# Patient Record
Sex: FEMALE | Race: WHITE | HISPANIC OR LATINO | URBAN - METROPOLITAN AREA
[De-identification: names, ages, dates, MRNs, and addresses within clinical notes are randomized per-mention and may not be internally consistent; named-entity substitution may affect disease eponyms.]

---

## 2024-09-16 ENCOUNTER — OFFICE (OUTPATIENT)
Dept: URBAN - METROPOLITAN AREA CLINIC 106 | Facility: CLINIC | Age: 44
End: 2024-09-16

## 2024-09-16 VITALS — WEIGHT: 174 LBS

## 2024-09-16 DIAGNOSIS — K75.81 NONALCOHOLIC STEATOHEPATITIS (NASH) - METABOLIC-ASSOCIATED STEATOHEPATITIS (MASH): ICD-10-CM

## 2024-09-16 DIAGNOSIS — K64.9 HEMORRHOIDS: ICD-10-CM

## 2024-09-16 DIAGNOSIS — K21.9 GERD: ICD-10-CM

## 2024-09-16 DIAGNOSIS — R11.0 NAUSEA: ICD-10-CM

## 2024-09-16 DIAGNOSIS — K59.00 CONSTIPATION: ICD-10-CM

## 2024-09-16 DIAGNOSIS — R14.1 BLOATING, GAS PAIN: ICD-10-CM

## 2024-09-16 PROCEDURE — 99204 OFFICE O/P NEW MOD 45 MIN: CPT | Performed by: INTERNAL MEDICINE

## 2024-09-16 RX ORDER — METOCLOPRAMIDE 10 MG/1
30 TABLET ORAL
Qty: 90 | Status: ACTIVE
Start: 2024-09-16

## 2024-09-16 RX ORDER — PANTOPRAZOLE SODIUM 40 MG/1
TABLET, DELAYED RELEASE ORAL
Qty: 90 | Status: ACTIVE
Start: 2024-09-16

## 2024-09-16 NOTE — SERVICEHPINOTES
ayanna   HEATHER GARCIA   is seen for an initial visit today.    The patient complains of abdominal pain that began 2-3 months ago.     It is localized as   epigastric region   pain.    It is described as   mild   in nature.   Pain quality is described as   burning and pressure it is non-radiating  .    Discomfort typically lasts   several  minutes   and has a course which is   steady  .   It usually starts   gradually  .    Symptom triggers include   bowel movements  .  Alleviating factors include   positional change, eating food  .    Symptoms have been   non-progressive/stable   since onset.    Alarm features noted:   none  .   The patient also reports   abdominal bloating/distension, heartburn and nausea  .     She has been taking pantoprazole 20mg "as needed for heartburn" as well as Zofran 4mg ODT "as needed for nausea." She believes she may have H. pylori and is requesting testing.  ayanna urena The patient has a personal history of metabolic dysfunction-associated steatotic liver disease. She denies RUQ pain. No recent labs on file. The patient is seen for evaluation of constipation.    Notes the onset of constipation   3 - 5  months   ago.  Symptoms started   gradually  .   Currently, the patient evacuates stool   3   times per   week  .    The stools are   sausage shaped, but lumpy ([place] Stool Type 2)  .   They are typically   brown   in color.    Associated symptoms include   alternating bowel pattern, abdominal bloating/distension, LLQ abdominal pain, generalized abdominal pain, a sense of incomplete evacuation  . She reports occasional struggles with internal hemorrhoids leading to occasional BRBPR when straining to have a bowel movement.  Symptoms are alleviated with   nothing specific  .  The patient has previously medicated symptoms with   Milk of Magnesia and stool softeners   with moderate relief. She has a family history of colon cancer she denies any previous colon cancer screenings.ayanna urena Of note, patient is on a GLP-1 medication and has noted worsening of both upper and lower GI symptoms since initiating medication.ayanna

## 2025-01-20 ENCOUNTER — APPOINTMENT (OUTPATIENT)
Dept: FAMILY MEDICINE | Age: 45
End: 2025-01-20

## 2025-01-28 ENCOUNTER — AMBULATORY SURGICAL CENTER (OUTPATIENT)
Dept: URBAN - METROPOLITAN AREA SURGERY 68 | Facility: SURGERY | Age: 45
End: 2025-01-28

## 2025-01-28 DIAGNOSIS — K22.10 ULCER OF ESOPHAGUS WITHOUT BLEEDING: ICD-10-CM

## 2025-01-28 DIAGNOSIS — K21.9 GASTRO-ESOPHAGEAL REFLUX DISEASE WITHOUT ESOPHAGITIS: ICD-10-CM

## 2025-01-28 DIAGNOSIS — K29.70 GASTRITIS, UNSPECIFIED, WITHOUT BLEEDING: ICD-10-CM

## 2025-01-28 PROBLEM — K63.5 POLYP OF COLON: Status: ACTIVE | Noted: 2025-01-28

## 2025-01-28 PROBLEM — K57.30 DIVERTICULOSIS OF LARGE INTESTINE WITHOUT PERFORATION OR ABS: Status: ACTIVE | Noted: 2025-01-28

## 2025-01-28 PROCEDURE — 43239 EGD BIOPSY SINGLE/MULTIPLE: CPT | Performed by: INTERNAL MEDICINE

## 2025-01-28 PROCEDURE — 45380 COLONOSCOPY AND BIOPSY: CPT | Performed by: INTERNAL MEDICINE

## 2025-03-21 NOTE — SERVICEROSSYSTEMNOTES
The pt would like to see a different podiatrist for toenail trimming . Thanks you. Under the care of PCM.

## 2025-04-16 ENCOUNTER — OFFICE (OUTPATIENT)
Dept: URBAN - METROPOLITAN AREA CLINIC 106 | Facility: CLINIC | Age: 45
End: 2025-04-16

## 2025-04-16 VITALS — WEIGHT: 170 LBS

## 2025-04-16 DIAGNOSIS — K59.00 CONSTIPATION: ICD-10-CM

## 2025-04-16 DIAGNOSIS — K29.70 GASTRITIS, UNSPECIFIED, WITHOUT BLEEDING: ICD-10-CM

## 2025-04-16 DIAGNOSIS — R11.0 NAUSEA: ICD-10-CM

## 2025-04-16 DIAGNOSIS — K57.30 DIVERTICULOSIS OF LARGE INTESTINE WITHOUT PERFORATION OR ABS: ICD-10-CM

## 2025-04-16 DIAGNOSIS — K62.5 RECTAL BLEEDING: ICD-10-CM

## 2025-04-16 DIAGNOSIS — K75.81 NONALCOHOLIC STEATOHEPATITIS (NASH) - METABOLIC-ASSOCIATED STEATOHEPATITIS (MASH): ICD-10-CM

## 2025-04-16 DIAGNOSIS — K64.9 HEMORRHOIDS: ICD-10-CM

## 2025-04-16 DIAGNOSIS — K22.10 ULCER OF ESOPHAGUS WITHOUT BLEEDING: ICD-10-CM

## 2025-04-16 DIAGNOSIS — K63.5 POLYP OF COLON: ICD-10-CM

## 2025-04-16 DIAGNOSIS — K21.9 GERD: ICD-10-CM

## 2025-04-16 DIAGNOSIS — R14.1 BLOATING, GAS PAIN: ICD-10-CM

## 2025-04-16 PROCEDURE — 99214 OFFICE O/P EST MOD 30 MIN: CPT | Performed by: INTERNAL MEDICINE

## 2025-04-16 RX ORDER — TENAPANOR HYDROCHLORIDE 53.2 MG/1
100 TABLET ORAL
Qty: 180 | Status: ACTIVE
Start: 2025-04-16

## 2025-04-16 NOTE — SERVICEHPINOTES
Patient came in for follow up after EGD/Colonoscopy, had some gas and bloating after the test but is feeling well at this time. Denies any abdominal pain, nausea, vomiting, diarrhea, or rectal bleeding.

## 2025-05-17 ENCOUNTER — OFFICE (OUTPATIENT)
Dept: URBAN - METROPOLITAN AREA CLINIC 106 | Facility: CLINIC | Age: 45
End: 2025-05-17

## 2025-05-17 DIAGNOSIS — K64.8 OTHER HEMORRHOIDS: ICD-10-CM

## 2025-05-17 PROBLEM — K29.70 GASTRITIS, UNSPECIFIED, WITHOUT BLEEDING: Status: ACTIVE | Noted: 2025-01-28

## 2025-05-17 PROBLEM — K22.10 ULCER OF ESOPHAGUS WITHOUT BLEEDING: Status: ACTIVE | Noted: 2025-01-28

## 2025-05-17 PROBLEM — K57.30 DIVERTICULOSIS OF LARGE INTESTINE WITHOUT PERFORATION OR ABS: Status: ACTIVE | Noted: 2025-01-28

## 2025-05-17 PROBLEM — K63.5 POLYP OF COLON: Status: ACTIVE | Noted: 2025-01-28

## 2025-05-17 PROCEDURE — 46221 LIGATION OF HEMORRHOID(S): CPT | Performed by: INTERNAL MEDICINE

## 2025-06-07 ENCOUNTER — OFFICE (OUTPATIENT)
Dept: URBAN - METROPOLITAN AREA CLINIC 106 | Facility: CLINIC | Age: 45
End: 2025-06-07

## 2025-06-07 VITALS — WEIGHT: 177 LBS

## 2025-06-07 DIAGNOSIS — K64.8 OTHER HEMORRHOIDS: ICD-10-CM

## 2025-06-07 PROBLEM — K57.30 DIVERTICULOSIS OF LARGE INTESTINE WITHOUT PERFORATION OR ABS: Status: ACTIVE | Noted: 2025-01-28

## 2025-06-07 PROBLEM — K22.10 ULCER OF ESOPHAGUS WITHOUT BLEEDING: Status: ACTIVE | Noted: 2025-01-28

## 2025-06-07 PROBLEM — K63.5 POLYP OF COLON: Status: ACTIVE | Noted: 2025-01-28

## 2025-06-07 PROBLEM — K29.70 GASTRITIS, UNSPECIFIED, WITHOUT BLEEDING: Status: ACTIVE | Noted: 2025-01-28

## 2025-06-07 PROCEDURE — 46221 LIGATION OF HEMORRHOID(S): CPT | Performed by: INTERNAL MEDICINE
